# Patient Record
(demographics unavailable — no encounter records)

---

## 2025-01-14 NOTE — HISTORY OF PRESENT ILLNESS
[FreeTextEntry1] : Pt presents for med check [de-identified] : She had an aorta US and a carotid artery US done and brought the results in for me to review.  She is able to function at work and take care of her home but still feeling somewhat down and anxious.  She is tolerating the vilazodone so far.  She had her colonoscopy yesterday.

## 2025-01-14 NOTE — PLAN
[FreeTextEntry1] : I reviewed the aortic ultrasound, transcranial Doppler ultrasound and carotid artery ultrasound that she brought in for me to review today.  I also reviewed the most recent lab results including CBC, CMP, lipid panel and TSH.  A depression screening was performed today and reviewed by me as well as an anxiety screening test was done and reviewed by me.  The anxiety and depression are not under optimal control, and she will take a total of 30 mg of vilazodone daily.  She declined seeing a psychiatrist but is open to seeing a psychologist or .  I checked I stop and renewed clonazepam 0.5 mg twice daily as needed.  She was advised to follow-up in 2 to 3 months or sooner if needed.

## 2025-01-14 NOTE — HISTORY OF PRESENT ILLNESS
[FreeTextEntry1] : Pt presents for med check [de-identified] : She had an aorta US and a carotid artery US done and brought the results in for me to review.  She is able to function at work and take care of her home but still feeling somewhat down and anxious.  She is tolerating the vilazodone so far.  She had her colonoscopy yesterday.

## 2025-03-31 NOTE — PLAN
[FreeTextEntry1] : The anxiety and depression are under acceptable control and she will continue taking vilazodone 20 mg daily and clonazepam 0.5 mg bid prn.  Labs were ordered and will be drawn in the office today to follow up on the hypercholesterolemia and to further assess her health. She will follow up in 3 months or sooner prn.

## 2025-03-31 NOTE — HISTORY OF PRESENT ILLNESS
[FreeTextEntry1] :  patient presents for 3 months follow up  No Complaints at this moment patient states she did not get the flu shot  pap- going Thursday  rock- scheduled for last June and got the script  colon - feb  [de-identified] : She didn't tolerate the higher dose of the vilazodone and is tolerating the 20 mg dose better.  She had been losing hair but that has improved.  She still feels a little anxious and depressed but she doesn't want to change her medication at this time.  She tried to get counseling but her insurance company only has a few providers and they weren't taking on new patients.

## 2025-05-12 NOTE — CONSULT LETTER
[Dear  ___] : Dear  [unfilled], [FreeTextEntry1] : I had the pleasure of evaluating your patient, MARLYS CORONADO. Please see my note enclosed for full details.   Thank you for allowing me to participate in the care of this patient. If you have any questions, please do not hesitate to contact me.   Sincerely,   Ivoyr Ruth MD, FACOG, [facility]  [address] [place] [address] Phone: [phone]  Fax: [phone]

## 2025-05-12 NOTE — HISTORY OF PRESENT ILLNESS
[FreeTextEntry1] : LMP:  50 years of age   Pt is a 68 yo presenting for consultation regarding management of severe cervical dysplasia. She was referred by Dr Osmar Staples. She has a history of abnormal Pap smears with positive HPV (not types 16/18/45) for several years. Her most recent Pap smear on 2025 showed Low-Grade Squamous Intraepithelial Lesion with positive HPV. A subsequent colposcopy performed on 2025 revealed Cervical Intraepithelial Neoplasia 2-3. The patient desires definitive surgical management with hysterectomy.   Health maintenance Lpap (4/3/25) LGSIL Lhpv (4/3/25) detected, not 16/18/45 Lmammo (24) wnl. BR2 Lcolonoscopy- 2025 WNL Ldexa (2020) Osteoporosis    PObHx: , NSVDx2, SABx1 PGynHx:+Abn pap. ASCUS,+HPV 2022, LGSIL,+HPV 3/28/24, (25) Colposcopy CIN2-3. Denies cysts/fibroids. SA, monogamous, male, x 3 yrs.    PMH: HLD, Depression & Anxiety, osteoporosis (declined medication) PSH: Lumpectomy L breast () benign FamHx: M- Breast CA. M aunt- Colon CA. M aunt: breast cancer SocialHx: Former smoker, denies ETOH, denies drug use Medications: Vibrid 20mg Allergies: Compazine/thorazine (hyperactive/anxious)

## 2025-05-12 NOTE — DISCUSSION/SUMMARY
[FreeTextEntry1] : 70 yo with persistent cervical dysplasia, desiring definitive management in the form of hysterectomy.  -Preoperative pelvic ultrasound for evaluation prior to surgery -Based on her exam, she is a candidate for minimally invasive approach. Discussed risks/benefits of minimally invasive procedure vs open procedure. Pt candidate for laparoscopic approach. Recommend laparoscopic total hysterectomy/BSO/cystoscopy.  -Needs medical clearance -Pt considering her options and will let us know how she would like to proceed -All questions answered to her satisfaction  I spent 49 minutes of total time on the day of the encounter preparing for the visit, review of records, interacting with the patient, EHR documentation, and coordinating care.

## 2025-05-12 NOTE — CONSULT LETTER
[Dear  ___] : Dear  [unfilled], [FreeTextEntry1] : I had the pleasure of evaluating your patient, MARLYS CORONADO. Please see my note enclosed for full details.   Thank you for allowing me to participate in the care of this patient. If you have any questions, please do not hesitate to contact me.   Sincerely,   Ivory Ruth MD, FACOG, [facility]  [address] [place] [address] Phone: [phone]  Fax: [phone]

## 2025-05-12 NOTE — REASON FOR VISIT
[Consultation] : consultation for [FreeTextEntry2] : hysterectomy [FreeTextEntry1] : Dr. Osmar Staples

## 2025-05-12 NOTE — DISCUSSION/SUMMARY
[FreeTextEntry1] : 68 yo with persistent cervical dysplasia, desiring definitive management in the form of hysterectomy.  -Preoperative pelvic ultrasound for evaluation prior to surgery -Based on her exam, she is a candidate for minimally invasive approach. Discussed risks/benefits of minimally invasive procedure vs open procedure. Pt candidate for laparoscopic approach. Recommend laparoscopic total hysterectomy/BSO/cystoscopy.  -Needs medical clearance -Pt considering her options and will let us know how she would like to proceed -All questions answered to her satisfaction  I spent 49 minutes of total time on the day of the encounter preparing for the visit, review of records, interacting with the patient, EHR documentation, and coordinating care.

## 2025-05-12 NOTE — PHYSICAL EXAM
[Chaperoned Physical Exam] : A chaperone was present in the examining room during all aspects of the physical examination. [Appropriately responsive] : appropriately responsive [Alert] : alert [No Acute Distress] : no acute distress [No Lymphadenopathy] : no lymphadenopathy [Soft] : soft [Non-tender] : non-tender [Non-distended] : non-distended [No HSM] : No HSM [No Lesions] : no lesions [No Mass] : no mass [Oriented x3] : oriented x3 [Labia Majora] : normal [Labia Minora] : normal [Normal] : normal [Uterine Adnexae] : normal [FreeTextEntry2] :  CHRISTINA Del Cid

## 2025-06-02 NOTE — HISTORY OF PRESENT ILLNESS
[FreeTextEntry1] : Pt is a 69 yo with persistent cervical dysplasia desires definitive management with a hysterectomy. She presents today to discuss surgical options prior to scheduling the procedure.  Pt was last seen in office on 5/8/25 for consultation regarding management of severe cervical dysplasia. She was referred by Dr Osmar Staples. Physical exam wnl. Based on her exam, she is a candidate for minimally invasive approach. Discussed getting Preoperative pelvic ultrasound for evaluation prior to surgery. Discussed risks/benefits of minimally invasive procedure vs open procedure. Pt candidate for laparoscopic approach. Recommend laparoscopic total hysterectomy/BSO/cystoscopy.  Interval History:  Pelvic/TVUS (5/15/25) ZP:  Uterus: AV,  5.4 cm X 3.5 cm X 1.8 cm Endometrium: 0.2 cm  ROV: 1.0 x 1.0 x 1.1 cm, No solid mass nor significant cysts present. LOV: 1.2 x 0.9 x 0.9 cm, No solid mass nor significant cysts present. Cul De Sac: No FF

## 2025-06-02 NOTE — HISTORY OF PRESENT ILLNESS
[FreeTextEntry1] : Pt is a 71 yo with persistent cervical dysplasia desires definitive management with a hysterectomy. She presents today to discuss surgical options prior to scheduling the procedure.  Pt was last seen in office on 5/8/25 for consultation regarding management of severe cervical dysplasia. She was referred by Dr Osmar Staples. Physical exam wnl. Based on her exam, she is a candidate for minimally invasive approach. Discussed getting Preoperative pelvic ultrasound for evaluation prior to surgery. Discussed risks/benefits of minimally invasive procedure vs open procedure. Pt candidate for laparoscopic approach. Recommend laparoscopic total hysterectomy/BSO/cystoscopy.  Interval History:  Pelvic/TVUS (5/15/25) ZP:  Uterus: AV,  5.4 cm X 3.5 cm X 1.8 cm Endometrium: 0.2 cm  ROV: 1.0 x 1.0 x 1.1 cm, No solid mass nor significant cysts present. LOV: 1.2 x 0.9 x 0.9 cm, No solid mass nor significant cysts present. Cul De Sac: No FF

## 2025-07-07 NOTE — HISTORY OF PRESENT ILLNESS
[FreeTextEntry1] : 69 yo with persistent cervical dysplasia, desiring definitive management in the form of hysterectomy.   Based on her exam, she is a candidate for minimally invasive approach. Discussed risks/benefits of minimally invasive procedure vs open procedure. Pt candidate for vaginal approach. Recommend pelvic EUA, vNOTES vaginal hysterectomy, BSO, cystoscopy, all indicated procedures.  She is scheduled for 07/18 at [facility]  Patient had PST today, labs reviewed and wnl.  Pt has medical clearance in 2 days.    Discussed surgical plan for pelvic EUA, vaginal natural orifice transluminal endoscopic surgery (vNOTES) vaginal hysterectomy, bilateral salpingoophorectomy, cystoscopy, all indicated procedures at [facility] on 7/18/25. Discussed risks to include, but are not limited to, bleeding, possible transfusion, infection, fistula, damage to nearby organs, vessels, or nerves resulting in temporary or permanent injury, deep venous thrombosis, and perioperative death. Discussed risk of laparoscopy or laparotomy if unable to have adequate visualization to complete using vaginal approach.  Reviewed benefit of curative treatment for cervical dysplasia. We did discuss that she will still need vaginal paps after surgery.   She also understands the limitations of vaginal surgery and the possibility of missing a surgical complication with need for subsequent re-exploration. She also understands the rationale for a cystoscopy at the completion of the procedure and the potential risks of cystoscopy.   Postoperative pain management was reviewed with plan for alternating Tylenol and Motrin with possible additional pain medication if needed. Pt requests Oxycodone as she is instructed not to take Motrin due to her antidepressant medication. Discussed the benefit of ice packs x first 24 hours.  Discussed postoperative expectations and restrictions.   We reviewed that there will be physician assistants and nurse practitioners in the operating room who may assist in the pelvic exam for learning purposes and who will be assisting in the surgery.   She agrees to proceed. All of her questions were answered to her satisfaction.

## 2025-07-07 NOTE — PLAN
[FreeTextEntry1] : 71 yo with persistent cervical dysplasia.  -Plan for pelvic EUA, vNOTES vaginal hysterectomy, BSO, cystoscopy, all indicated procedures 7/18. -Hysterectomy consent form sompleted -Postoperative restrictions and expectations reviewed -Pt to have OTC Motrin/Tylenol -Rx Oxycodone/colace sent, discussed unlikely to need Oxycodone given vaginal approach -All questions answered to her satisfaction  I spent 35 minutes of total time on the day of the encounter preparing for the visit, review of records, interacting with the patient, EHR documentation, and coordinating care.

## 2025-07-09 NOTE — ASSESSMENT
[Patient Optimized for Surgery] : Patient optimized for surgery [No Further Testing Recommended] : no further testing recommended [Modify medications prior to procedure] : Modify medications prior to procedure [As per surgery] : as per surgery [FreeTextEntry4] : She is medically cleared for surgery. [FreeTextEntry7] : all supplements are on hold

## 2025-07-09 NOTE — HISTORY OF PRESENT ILLNESS
[No Pertinent Cardiac History] : no history of aortic stenosis, atrial fibrillation, coronary artery disease, recent myocardial infarction, or implantable device/pacemaker [No Pertinent Pulmonary History] : no history of asthma, COPD, sleep apnea, or smoking [No Adverse Anesthesia Reaction] : no adverse anesthesia reaction in self or family member [(Patient denies any chest pain, claudication, dyspnea on exertion, orthopnea, palpitations or syncope)] : Patient denies any chest pain, claudication, dyspnea on exertion, orthopnea, palpitations or syncope [Family Member] : no family member with adverse anesthesia reaction/sudden death [Self] : no previous adverse anesthesia reaction [Chronic Anticoagulation] : no chronic anticoagulation [Chronic Kidney Disease] : no chronic kidney disease [Diabetes] : no diabetes [FreeTextEntry1] : Total hysterectomy [FreeTextEntry2] : 7/18/25 [FreeTextEntry3] : Dr. Ruth [FreeTextEntry4] : Recent mammogram [facility]

## 2025-07-29 NOTE — HISTORY OF PRESENT ILLNESS
[FreeTextEntry1] : patient presents a follow up  not feeling well has anxiety and depression is on medication and wants to discuss things with provider  [de-identified] : She is doing well regarding the surgery. She still feels very anxious and overwhelmed. She also feels down and depressed and prefers to stay at home and not go out. She does socialize when urged by her family and friends.

## 2025-07-29 NOTE — PLAN
[FreeTextEntry1] : She was seen in the presence of her daughter today.  I ordered a psychiatry consultation and sent an email to [facility] to try to facilitate it.  For now, she will stop taking the vilazodone and will start paroxetine 10 mg daily. She will continue taking clonazepam 0.5 mg bid prn. She will follow up in 1 month unless she sees psychiatry first.  I reviewed the pathology report from the surgery.